# Patient Record
Sex: FEMALE | Race: BLACK OR AFRICAN AMERICAN | NOT HISPANIC OR LATINO | URBAN - NONMETROPOLITAN AREA
[De-identification: names, ages, dates, MRNs, and addresses within clinical notes are randomized per-mention and may not be internally consistent; named-entity substitution may affect disease eponyms.]

---
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
## 2021-01-25 ENCOUNTER — IMPORTED ENCOUNTER (OUTPATIENT)
Dept: URBAN - NONMETROPOLITAN AREA CLINIC 1 | Facility: CLINIC | Age: 21
End: 2021-01-25

## 2021-01-25 PROCEDURE — 92014 COMPRE OPH EXAM EST PT 1/>: CPT

## 2021-01-25 PROCEDURE — 92015 DETERMINE REFRACTIVE STATE: CPT

## 2021-01-25 NOTE — PATIENT DISCUSSION
Myopia OU- Discussed diagnosis in detail with patient- New glasses Rx given today- Continue to monitor- RTC 1 year complete Cataract  OD - Discussed diagnosis in detail with patient / mother - Discussed signs and symptoms of progression - Discussed UV protection- No treatment needed at this time - Continue to monitorOptic nerve head drusen OD - Discussed diagnosis in detail with patient's mother - Patient is stable at this time - Patient reports no headaches - Continue to monitor

## 2022-04-09 ASSESSMENT — VISUAL ACUITY
OS_SC: 20/20
OD_SC: 20/29
OU_CC: 20/20-2
OS_CC: 20/25+1

## 2022-04-09 ASSESSMENT — TONOMETRY
OS_IOP_MMHG: 18
OS_IOP_MMHG: 19
OD_IOP_MMHG: 18
OD_IOP_MMHG: 19

## 2022-04-21 ENCOUNTER — ESTABLISHED PATIENT (OUTPATIENT)
Dept: URBAN - NONMETROPOLITAN AREA CLINIC 1 | Facility: CLINIC | Age: 22
End: 2022-04-21

## 2022-04-21 DIAGNOSIS — H52.13: ICD-10-CM

## 2022-04-21 PROCEDURE — 92014 COMPRE OPH EXAM EST PT 1/>: CPT

## 2022-04-21 PROCEDURE — 92015 DETERMINE REFRACTIVE STATE: CPT

## 2022-04-21 ASSESSMENT — TONOMETRY
OD_IOP_MMHG: 18
OS_IOP_MMHG: 18

## 2022-04-21 ASSESSMENT — VISUAL ACUITY
OD_CC: 20/25-1
OS_CC: 20/20-2

## 2022-04-21 NOTE — PATIENT DISCUSSION
Myopia OU- Discussed diagnosis in detail with patient- New glasses Rx given today- Continue to monitor- RTC 1 year complete Cataract  OD - Discussed diagnosis in detail with patient / mother - Discussed signs and symptoms of progression - Discussed UV protection- No treatment needed at this time - Continue to monitorOptic nerve head drusen OD - Discussed diagnosis in detail with patient's mother - Patient is stable at this time - Patient reports no headaches - Continue to monitor.

## 2024-01-16 ENCOUNTER — ESTABLISHED PATIENT (OUTPATIENT)
Dept: URBAN - NONMETROPOLITAN AREA CLINIC 1 | Facility: CLINIC | Age: 24
End: 2024-01-16

## 2024-01-16 DIAGNOSIS — H52.13: ICD-10-CM

## 2024-01-16 PROCEDURE — 92015 DETERMINE REFRACTIVE STATE: CPT

## 2024-01-16 PROCEDURE — 92014 COMPRE OPH EXAM EST PT 1/>: CPT

## 2024-01-16 ASSESSMENT — VISUAL ACUITY
OS_SC: 20/30
OD_PH: 20/30
OD_SC: 20/60

## 2024-01-16 ASSESSMENT — TONOMETRY
OD_IOP_MMHG: 16
OS_IOP_MMHG: 16